# Patient Record
Sex: FEMALE | Race: WHITE | NOT HISPANIC OR LATINO | Employment: OTHER | URBAN - METROPOLITAN AREA
[De-identification: names, ages, dates, MRNs, and addresses within clinical notes are randomized per-mention and may not be internally consistent; named-entity substitution may affect disease eponyms.]

---

## 2017-03-17 ENCOUNTER — HISTORICAL (OUTPATIENT)
Dept: RADIOLOGY | Facility: HOSPITAL | Age: 77
End: 2017-03-17

## 2020-09-09 ENCOUNTER — HISTORICAL (OUTPATIENT)
Dept: ADMINISTRATIVE | Facility: HOSPITAL | Age: 80
End: 2020-09-09

## 2020-10-21 ENCOUNTER — HISTORICAL (OUTPATIENT)
Dept: ADMINISTRATIVE | Facility: HOSPITAL | Age: 80
End: 2020-10-21

## 2021-07-23 ENCOUNTER — HISTORICAL (OUTPATIENT)
Dept: URGENT CARE | Facility: CLINIC | Age: 81
End: 2021-07-23

## 2021-07-23 LAB
BILIRUB SERPL-MCNC: NEGATIVE MG/DL
BLOOD URINE, POC: NEGATIVE
CLARITY, POC UA: NORMAL
COLOR, POC UA: NORMAL
GLUCOSE UR QL STRIP: NEGATIVE
KETONES UR QL STRIP: NEGATIVE
LEUKOCYTE EST, POC UA: NORMAL
NITRITE, POC UA: POSITIVE
PH, POC UA: 6
PROTEIN, POC: NEGATIVE
SPECIFIC GRAVITY, POC UA: 1.01
UROBILINOGEN, POC UA: NORMAL

## 2021-08-02 LAB
BILIRUB SERPL-MCNC: NEGATIVE MG/DL
BLOOD URINE, POC: NEGATIVE
CLARITY, POC UA: CLEAR
COLOR, POC UA: NORMAL
GLUCOSE UR QL STRIP: NEGATIVE
KETONES UR QL STRIP: NEGATIVE
LEUKOCYTE EST, POC UA: NORMAL
NITRITE, POC UA: POSITIVE
PH, POC UA: 7
PROTEIN, POC: NEGATIVE
SPECIFIC GRAVITY, POC UA: 1.01
UROBILINOGEN, POC UA: NORMAL

## 2021-12-13 ENCOUNTER — HISTORICAL (OUTPATIENT)
Dept: ADMINISTRATIVE | Facility: HOSPITAL | Age: 81
End: 2021-12-13

## 2021-12-13 LAB — SARS-COV-2 RNA RESP QL NAA+PROBE: NEGATIVE

## 2022-01-06 ENCOUNTER — HISTORICAL (OUTPATIENT)
Dept: ADMINISTRATIVE | Facility: HOSPITAL | Age: 82
End: 2022-01-06

## 2022-01-06 LAB — SARS-COV-2 RNA RESP QL NAA+PROBE: POSITIVE

## 2022-04-10 ENCOUNTER — HISTORICAL (OUTPATIENT)
Dept: ADMINISTRATIVE | Facility: HOSPITAL | Age: 82
End: 2022-04-10
Payer: MEDICARE

## 2022-04-30 VITALS
WEIGHT: 189.63 LBS | BODY MASS INDEX: 37.23 KG/M2 | HEIGHT: 60 IN | DIASTOLIC BLOOD PRESSURE: 60 MMHG | OXYGEN SATURATION: 97 % | SYSTOLIC BLOOD PRESSURE: 123 MMHG

## 2022-04-30 NOTE — OP NOTE
Patient:   Pepper Bob            MRN: [id number]            FIN: [id number]               Age:   80 years     Sex:  Female     :  1940   Associated Diagnoses:   None   Author:   Kenrick Hernandez MD      Preoperative diagnosis: Cataract of the right  eye     Postoperative diagnosis: Same     Operative procedure: Cataract extraction with intraocular lens implant    Lens Style: [id number]    The patient was brought to the operating theater by wheelchair and under light sedation given topical anesthesia using 0.75% Marcaine.  After adequate anesthesia the patient was then prepped and draped in usual ophthalmological manner.   Karsten lid speculums were applied and under the surgical microscope a keratome incision was made temporally using a jessee keratome blade and a 15° jessee keratome stab incision was made in the superior nasal quadrant.  Viscoat was instilled into the anterior chamber and an anterior capsulorrhexis was performed using a bent 25-gauge needle and the anterior capsule flap withdrawn with Kelman forceps. Using an irrigating Kelman cystotome the nucleus was irrigated and rocked free from the cortical bed and the handpiece was withdrawn. The phacoemulsification handpiece was introduced into the eye and phacoemulsification carried out the posterior chamber and the iris plane while a nucleus manipulator was used to direct the nucleus fragments  towards the phacoemulsification tip and prevent corneal contact. After phacoemulsification of the nucleus was completed the handpiece was withdrawn and an irrigation-aspiration handpiece was introduced into the eye and all visible cortical fragments were aspirated from the eye without difficulty. The handpiece was withdrawn and Healon was introduced into the eye to inflate the anterior chamber and the capsular bag.  An intraocular lens implant was selected, inspected, folded and placed into the lens injector and then the lens carefully injected  into the capsular bag while the haptics were positioned using the nucleus manipulator. The Healon was then aspirated from the eye using an irrigation-aspiration handpiece and the handpiece withdrawn from the eye. The anterior chamber was inflated with balanced salt solution and the wound checked for water tightness and found to be intact.  The antibiotic drops used preoperatively were instilled into the eye and the patient sent to the outpatient recovery in good condition.

## 2022-04-30 NOTE — OP NOTE
Patient:   Pepper Bob            MRN: [id number]            FIN: [id number]               Age:   80 years     Sex:  Female     :  1940   Associated Diagnoses:   None   Author:   Kenrick Hernandez MD      Preoperative diagnosis: Cataract of the   left eye     Postoperative diagnosis: Same     Operative procedure: Cataract extraction with intraocular lens implant    Lens Style: ZCB00    The patient was brought to the operating theater by wheelchair and under light sedation given topical anesthesia using 0.75% Marcaine.  After adequate anesthesia the patient was then prepped and draped in usual ophthalmological manner.   Karsten lid speculums were applied and under the surgical microscope a keratome incision was made temporally using a jessee keratome blade and a 15° jessee keratome stab incision was made in the superior nasal quadrant.  Viscoat was instilled into the anterior chamber and an anterior capsulorrhexis was performed using a bent 25-gauge needle and the anterior capsule flap withdrawn with Kelman forceps. Using an irrigating Kelman cystotome the nucleus was irrigated and rocked free from the cortical bed and the handpiece was withdrawn. The phacoemulsification handpiece was introduced into the eye and phacoemulsification carried out the posterior chamber and the iris plane while a nucleus manipulator was used to direct the nucleus fragments  towards the phacoemulsification tip and prevent corneal contact. After phacoemulsification of the nucleus was completed the handpiece was withdrawn and an irrigation-aspiration handpiece was introduced into the eye and all visible cortical fragments were aspirated from the eye without difficulty. The handpiece was withdrawn and Healon was introduced into the eye to inflate the anterior chamber and the capsular bag.  An intraocular lens implant was selected, inspected, folded and placed into the lens injector and then the lens carefully injected  into the capsular bag while the haptics were positioned using the nucleus manipulator. The Healon was then aspirated from the eye using an irrigation-aspiration handpiece and the handpiece withdrawn from the eye. The anterior chamber was inflated with balanced salt solution and the wound checked for water tightness and found to be intact.  The antibiotic drops used preoperatively were instilled into the eye and the patient sent to the outpatient recovery in good condition.

## 2022-09-21 ENCOUNTER — HISTORICAL (OUTPATIENT)
Dept: ADMINISTRATIVE | Facility: HOSPITAL | Age: 82
End: 2022-09-21
Payer: MEDICARE

## 2023-03-29 ENCOUNTER — LAB VISIT (OUTPATIENT)
Dept: LAB | Facility: HOSPITAL | Age: 83
End: 2023-03-29
Attending: INTERNAL MEDICINE
Payer: MEDICARE

## 2023-03-29 DIAGNOSIS — L70.9 SAPHO SYNDROME: ICD-10-CM

## 2023-03-29 DIAGNOSIS — E78.5 HYPERLIPIDEMIA, UNSPECIFIED HYPERLIPIDEMIA TYPE: Primary | ICD-10-CM

## 2023-03-29 DIAGNOSIS — R53.83 FATIGUE, UNSPECIFIED TYPE: ICD-10-CM

## 2023-03-29 DIAGNOSIS — E55.9 VITAMIN D DEFICIENCY DISEASE: ICD-10-CM

## 2023-03-29 DIAGNOSIS — E66.9 OBESITY, UNSPECIFIED CLASSIFICATION, UNSPECIFIED OBESITY TYPE, UNSPECIFIED WHETHER SERIOUS COMORBIDITY PRESENT: ICD-10-CM

## 2023-03-29 DIAGNOSIS — I51.9 MYXEDEMA HEART DISEASE: ICD-10-CM

## 2023-03-29 DIAGNOSIS — E03.9 MYXEDEMA HEART DISEASE: ICD-10-CM

## 2023-03-29 DIAGNOSIS — M86.9 SAPHO SYNDROME: ICD-10-CM

## 2023-03-29 DIAGNOSIS — M65.9 SAPHO SYNDROME: ICD-10-CM

## 2023-03-29 DIAGNOSIS — M85.80 SAPHO SYNDROME: ICD-10-CM

## 2023-03-29 DIAGNOSIS — L40.3 SAPHO SYNDROME: ICD-10-CM

## 2023-03-29 DIAGNOSIS — I10 ESSENTIAL HYPERTENSION, MALIGNANT: ICD-10-CM

## 2023-03-29 LAB
ALBUMIN SERPL-MCNC: 4.1 G/DL (ref 3.4–4.8)
ALBUMIN/GLOB SERPL: 1.3 RATIO (ref 1.1–2)
ALP SERPL-CCNC: 46 UNIT/L (ref 40–150)
ALT SERPL-CCNC: 21 UNIT/L (ref 0–55)
AST SERPL-CCNC: 22 UNIT/L (ref 5–34)
BILIRUBIN DIRECT+TOT PNL SERPL-MCNC: 0.7 MG/DL
BUN SERPL-MCNC: 15.2 MG/DL (ref 9.8–20.1)
CALCIUM SERPL-MCNC: 9.9 MG/DL (ref 8.4–10.2)
CHLORIDE SERPL-SCNC: 102 MMOL/L (ref 98–107)
CHOLEST SERPL-MCNC: 137 MG/DL
CHOLEST/HDLC SERPL: 2 {RATIO} (ref 0–5)
CO2 SERPL-SCNC: 30 MMOL/L (ref 23–31)
CREAT SERPL-MCNC: 1 MG/DL (ref 0.55–1.02)
DEPRECATED CALCIDIOL+CALCIFEROL SERPL-MC: 26 NG/ML (ref 30–80)
ERYTHROCYTE [DISTWIDTH] IN BLOOD BY AUTOMATED COUNT: 12.9 % (ref 11.5–17)
GFR SERPLBLD CREATININE-BSD FMLA CKD-EPI: 56 MLS/MIN/1.73/M2
GLOBULIN SER-MCNC: 3.2 GM/DL (ref 2.4–3.5)
GLUCOSE SERPL-MCNC: 104 MG/DL (ref 82–115)
HCT VFR BLD AUTO: 41.5 % (ref 37–47)
HDLC SERPL-MCNC: 65 MG/DL (ref 35–60)
HGB BLD-MCNC: 13.6 G/DL (ref 12–16)
LDLC SERPL CALC-MCNC: 57 MG/DL (ref 50–140)
MCH RBC QN AUTO: 30.6 PG (ref 27–31)
MCHC RBC AUTO-ENTMCNC: 32.8 G/DL (ref 33–36)
MCV RBC AUTO: 93.5 FL (ref 80–94)
NRBC BLD AUTO-RTO: 0 %
PLATELET # BLD AUTO: 241 X10(3)/MCL (ref 130–400)
PMV BLD AUTO: 10.6 FL (ref 7.4–10.4)
POTASSIUM SERPL-SCNC: 4.8 MMOL/L (ref 3.5–5.1)
PROT SERPL-MCNC: 7.3 GM/DL (ref 5.8–7.6)
RBC # BLD AUTO: 4.44 X10(6)/MCL (ref 4.2–5.4)
SODIUM SERPL-SCNC: 135 MMOL/L (ref 136–145)
T3FREE SERPL-MCNC: 2.32 PG/ML (ref 1.57–3.91)
T4 FREE SERPL-MCNC: 1.23 NG/DL (ref 0.7–1.48)
TRIGL SERPL-MCNC: 75 MG/DL (ref 37–140)
TSH SERPL-ACNC: 0.67 UIU/ML (ref 0.35–4.94)
VLDLC SERPL CALC-MCNC: 15 MG/DL
WBC # SPEC AUTO: 6.2 X10(3)/MCL (ref 4.5–11.5)

## 2023-03-29 PROCEDURE — 82306 VITAMIN D 25 HYDROXY: CPT

## 2023-03-29 PROCEDURE — 80061 LIPID PANEL: CPT

## 2023-03-29 PROCEDURE — 84481 FREE ASSAY (FT-3): CPT

## 2023-03-29 PROCEDURE — 36415 COLL VENOUS BLD VENIPUNCTURE: CPT

## 2023-03-29 PROCEDURE — 84443 ASSAY THYROID STIM HORMONE: CPT

## 2023-03-29 PROCEDURE — 84439 ASSAY OF FREE THYROXINE: CPT

## 2023-03-29 PROCEDURE — 80053 COMPREHEN METABOLIC PANEL: CPT

## 2023-03-29 PROCEDURE — 85027 COMPLETE CBC AUTOMATED: CPT

## 2024-04-19 ENCOUNTER — OFFICE VISIT (OUTPATIENT)
Dept: URGENT CARE | Facility: CLINIC | Age: 84
End: 2024-04-19
Payer: MEDICARE

## 2024-04-19 VITALS
HEART RATE: 101 BPM | TEMPERATURE: 98 F | OXYGEN SATURATION: 97 % | BODY MASS INDEX: 38.1 KG/M2 | SYSTOLIC BLOOD PRESSURE: 100 MMHG | WEIGHT: 189 LBS | RESPIRATION RATE: 16 BRPM | DIASTOLIC BLOOD PRESSURE: 70 MMHG | HEIGHT: 59 IN

## 2024-04-19 DIAGNOSIS — J20.8 ACUTE BACTERIAL BRONCHITIS: ICD-10-CM

## 2024-04-19 DIAGNOSIS — B96.89 ACUTE BACTERIAL BRONCHITIS: ICD-10-CM

## 2024-04-19 DIAGNOSIS — R05.9 COUGH, UNSPECIFIED TYPE: Primary | ICD-10-CM

## 2024-04-19 LAB
CTP QC/QA: YES
MOLECULAR STREP A: NEGATIVE
POC MOLECULAR INFLUENZA A AGN: NEGATIVE
POC MOLECULAR INFLUENZA B AGN: NEGATIVE
SARS-COV-2 RDRP RESP QL NAA+PROBE: NEGATIVE

## 2024-04-19 PROCEDURE — 87502 INFLUENZA DNA AMP PROBE: CPT | Mod: QW,,, | Performed by: FAMILY MEDICINE

## 2024-04-19 PROCEDURE — 99214 OFFICE O/P EST MOD 30 MIN: CPT | Mod: ,,, | Performed by: FAMILY MEDICINE

## 2024-04-19 PROCEDURE — 87635 SARS-COV-2 COVID-19 AMP PRB: CPT | Mod: QW,,, | Performed by: FAMILY MEDICINE

## 2024-04-19 PROCEDURE — 87651 STREP A DNA AMP PROBE: CPT | Mod: QW,,, | Performed by: FAMILY MEDICINE

## 2024-04-19 RX ORDER — ALBUTEROL SULFATE 90 UG/1
AEROSOL, METERED RESPIRATORY (INHALATION)
COMMUNITY
Start: 2022-01-06

## 2024-04-19 RX ORDER — IPRATROPIUM BROMIDE 21 UG/1
SPRAY, METERED NASAL
COMMUNITY
Start: 2024-04-17

## 2024-04-19 RX ORDER — ALBUTEROL SULFATE 90 UG/1
1-2 AEROSOL, METERED RESPIRATORY (INHALATION) EVERY 4 HOURS PRN
Qty: 18 G | Refills: 0 | Status: SHIPPED | OUTPATIENT
Start: 2024-04-19 | End: 2024-05-19

## 2024-04-19 RX ORDER — VALSARTAN AND HYDROCHLOROTHIAZIDE 320; 12.5 MG/1; MG/1
1 TABLET, FILM COATED ORAL
COMMUNITY
Start: 2024-04-09

## 2024-04-19 RX ORDER — PROMETHAZINE HYDROCHLORIDE AND DEXTROMETHORPHAN HYDROBROMIDE 6.25; 15 MG/5ML; MG/5ML
5 SYRUP ORAL EVERY 6 HOURS PRN
Qty: 120 ML | Refills: 0 | Status: SHIPPED | OUTPATIENT
Start: 2024-04-19 | End: 2024-04-25

## 2024-04-19 RX ORDER — SELENIUM SULFIDE 23 MG/ML
SHAMPOO TOPICAL
COMMUNITY
Start: 2023-12-04

## 2024-04-19 RX ORDER — OXYBUTYNIN CHLORIDE 15 MG/1
15 TABLET, EXTENDED RELEASE ORAL
COMMUNITY
Start: 2024-04-09

## 2024-04-19 RX ORDER — LEVOTHYROXINE SODIUM 112 UG/1
112 TABLET ORAL
COMMUNITY
Start: 2024-03-31

## 2024-04-19 RX ORDER — PREDNISONE 20 MG/1
20 TABLET ORAL DAILY
Qty: 5 TABLET | Refills: 0 | Status: SHIPPED | OUTPATIENT
Start: 2024-04-19 | End: 2024-04-24

## 2024-04-19 RX ORDER — DOXYCYCLINE 100 MG/1
100 CAPSULE ORAL 2 TIMES DAILY
Qty: 14 CAPSULE | Refills: 0 | Status: SHIPPED | OUTPATIENT
Start: 2024-04-19 | End: 2024-04-26

## 2024-04-19 RX ORDER — PHENTERMINE HYDROCHLORIDE 37.5 MG/1
37.5 TABLET ORAL
COMMUNITY
Start: 2024-04-17

## 2024-04-19 RX ORDER — ROSUVASTATIN CALCIUM 20 MG/1
20 TABLET, COATED ORAL NIGHTLY
COMMUNITY
Start: 2024-04-09

## 2024-04-19 RX ORDER — SERTRALINE HYDROCHLORIDE 100 MG/1
100 TABLET, FILM COATED ORAL
COMMUNITY
Start: 2024-04-09

## 2024-04-19 NOTE — PROGRESS NOTES
"Subjective:      Patient ID: Pepper Bob is a 84 y.o. female.    Vitals:  height is 4' 11" (1.499 m) and weight is 85.7 kg (189 lb). Her temperature is 97.6 °F (36.4 °C). Her blood pressure is 100/70 and her pulse is 101. Her respiration is 16 and oxygen saturation is 97%.     Chief Complaint: Cough     Patient is a 84 y.o. female who presents to urgent care with complaints of cough (x 1 wk), "night time wheezing" (x 2 days), weakness ( x 1.5 days). Alleviating factors include rx nasal spray, xicam, cough drops x 4 days no relief.  Patient denies fever shortness of breath nausea vomiting or diarrhea. "Symptoms began as allergies, then ultimately worsened, becoming something else". Would like to test for covid/flu/strep.  Patient states she saw her PCP recently and he is changing her blood pressure medications but not sure why he is doing that.  Patient unaware that her blood pressure so low.  Denies any dizziness or lightheadedness upon standing.        Constitution: Negative.   HENT: Negative.     Cardiovascular: Negative.    Eyes: Negative.    Respiratory:  Positive for cough and wheezing.    Gastrointestinal: Negative.    Genitourinary: Negative.    Musculoskeletal: Negative.    Skin: Negative.    Allergic/Immunologic: Negative.    Neurological: Negative.    Hematologic/Lymphatic: Negative.       Objective:     Physical Exam   Constitutional: She is oriented to person, place, and time. She appears well-developed. She is cooperative.  Non-toxic appearance. She does not appear ill. No distress.   HENT:   Head: Normocephalic and atraumatic.   Ears:   Right Ear: Hearing and external ear normal.   Left Ear: Hearing and external ear normal.   Mouth/Throat: Oropharynx is clear and moist and mucous membranes are normal. No oropharyngeal exudate or posterior oropharyngeal erythema (postnasal drip).   Eyes: Conjunctivae and lids are normal.   Neck: Trachea normal and phonation normal. Neck supple. No edema present. " No erythema present. No neck rigidity present.   Cardiovascular: Normal rate.   Pulmonary/Chest: Effort normal. No stridor. No respiratory distress. She has no decreased breath sounds. She has wheezes. She has no rhonchi. She has no rales.   Abdominal: Normal appearance.   Neurological: She is alert and oriented to person, place, and time. She exhibits normal muscle tone. Coordination normal.   Skin: Skin is warm, dry, intact, not diaphoretic and no rash.   Psychiatric: Her speech is normal and behavior is normal. Mood, judgment and thought content normal.   Nursing note and vitals reviewed.         Previous History      Review of patient's allergies indicates:   Allergen Reactions    Penicillin g potassium Swelling    Tramadol Itching    Codeine Nausea And Vomiting       Past Medical History:   Diagnosis Date    Anxiety disorder, unspecified     HTN (hypertension)     Mild intermittent asthma, uncomplicated     Mixed hyperlipidemia      Current Outpatient Medications   Medication Instructions    albuterol (PROAIR HFA) 90 mcg/actuation inhaler Inhale into the lungs.    albuterol (VENTOLIN HFA) 90 mcg/actuation inhaler 1-2 puffs, Inhalation, Every 4 hours PRN, Rescue    doxycycline (MONODOX) 100 mg, Oral, 2 times daily    ipratropium (ATROVENT) 21 mcg (0.03 %) nasal spray SMARTSI Spray(s) Both Nares Every 6 Hours PRN    levothyroxine (SYNTHROID) 112 mcg, Oral    oxybutynin (DITROPAN XL) 15 mg, Oral    phentermine (ADIPEX-P) 37.5 mg, Oral    predniSONE (DELTASONE) 20 mg, Oral, Daily    promethazine-dextromethorphan (PROMETHAZINE-DM) 6.25-15 mg/5 mL Syrp 5 mLs, Oral, Every 6 hours PRN    rosuvastatin (CRESTOR) 20 mg, Oral, Nightly    selenium sulfide (SELRX) 2.3 % Sham Apply topically.    sertraline (ZOLOFT) 100 mg, Oral    valsartan-hydrochlorothiazide (DIOVAN-HCT) 320-12.5 mg per tablet 1 tablet, Oral     Past Surgical History:   Procedure Laterality Date    NO PAST SURGERIES       Family History   Problem  "Relation Name Age of Onset    No Known Problems Mother      No Known Problems Father      No Known Problems Sister         Social History     Tobacco Use    Smoking status: Never     Passive exposure: Never    Smokeless tobacco: Never   Substance Use Topics    Alcohol use: Not Currently    Drug use: Never        Physical Exam      Vital Signs Reviewed   /70 Comment: "feels weak"  Pulse 101   Temp 97.6 °F (36.4 °C)   Resp 16   Ht 4' 11" (1.499 m)   Wt 85.7 kg (189 lb)   SpO2 97%   BMI 38.17 kg/m²        Procedures    Procedures     Labs     Results for orders placed or performed in visit on 04/19/24   POCT COVID-19 Rapid Screening   Result Value Ref Range    POC Rapid COVID Negative Negative     Acceptable Yes    POCT Influenza A/B Molecular   Result Value Ref Range    POC Molecular Influenza A Ag Negative Negative    POC Molecular Influenza B Ag Negative Negative     Acceptable Yes    POCT Strep A, Molecular   Result Value Ref Range    Molecular Strep A, POC Negative Negative     Acceptable Yes        Assessment:     1. Cough, unspecified type    2. Acute bacterial bronchitis        Plan:   Negative COVID flu and strep  Medications sent to pharmacy  Recheck of your blood pressure was 100/70.  Call your PCP on Monday to let them know about your blood pressure.  Get plenty of hydration and rest  Using inhaler as needed for shortness of breath or wheezing  Cough syrup may cause drowsiness.  Do not drink alcohol or drive when taking it.  Best to take at bedtime.  Continue the prescribed nasal spray  If symptoms persist or worsen return to clinic or seek medical attention immediately    Cough, unspecified type  -     POCT COVID-19 Rapid Screening  -     POCT Influenza A/B Molecular  -     POCT Strep A, Molecular    Acute bacterial bronchitis    Other orders  -     doxycycline (MONODOX) 100 MG capsule; Take 1 capsule (100 mg total) by mouth 2 (two) times daily. for " 7 days  Dispense: 14 capsule; Refill: 0  -     promethazine-dextromethorphan (PROMETHAZINE-DM) 6.25-15 mg/5 mL Syrp; Take 5 mLs by mouth every 6 (six) hours as needed (cough).  Dispense: 120 mL; Refill: 0  -     albuterol (VENTOLIN HFA) 90 mcg/actuation inhaler; Inhale 1-2 puffs into the lungs every 4 (four) hours as needed for Wheezing or Shortness of Breath. Rescue  Dispense: 18 g; Refill: 0  -     predniSONE (DELTASONE) 20 MG tablet; Take 1 tablet (20 mg total) by mouth once daily. for 5 days  Dispense: 5 tablet; Refill: 0

## 2024-04-19 NOTE — PATIENT INSTRUCTIONS
Plan:   Negative COVID flu and strep  Medications sent to pharmacy  Recheck of your blood pressure was 100/70.  Call your PCP on Monday to let them know about your blood pressure.  Get plenty of hydration and rest  Using inhaler as needed for shortness of breath or wheezing  Cough syrup may cause drowsiness.  Do not drink alcohol or drive when taking it.  Best to take at bedtime.  Continue the prescribed nasal spray  If symptoms persist or worsen return to clinic or seek medical attention immediately

## 2024-05-30 ENCOUNTER — LAB VISIT (OUTPATIENT)
Dept: LAB | Facility: HOSPITAL | Age: 84
End: 2024-05-30
Attending: DERMATOLOGY
Payer: MEDICARE

## 2024-05-30 DIAGNOSIS — L29.9 PRURITUS: ICD-10-CM

## 2024-05-30 DIAGNOSIS — Z79.899 ENCOUNTER FOR LONG-TERM (CURRENT) USE OF OTHER MEDICATIONS: ICD-10-CM

## 2024-05-30 DIAGNOSIS — Z11.59 SCREENING FOR VIRAL DISEASE: Primary | ICD-10-CM

## 2024-05-30 DIAGNOSIS — L30.9 DERMATITIS: ICD-10-CM

## 2024-05-30 LAB
ALBUMIN SERPL-MCNC: 3.8 G/DL (ref 3.4–4.8)
ALBUMIN/GLOB SERPL: 1.2 RATIO (ref 1.1–2)
ALP SERPL-CCNC: 55 UNIT/L (ref 40–150)
ALT SERPL-CCNC: 15 UNIT/L (ref 0–55)
ANION GAP SERPL CALC-SCNC: 6 MEQ/L
AST SERPL-CCNC: 18 UNIT/L (ref 5–34)
BILIRUB SERPL-MCNC: 0.6 MG/DL
BUN SERPL-MCNC: 12.9 MG/DL (ref 9.8–20.1)
CALCIUM SERPL-MCNC: 9 MG/DL (ref 8.4–10.2)
CHLORIDE SERPL-SCNC: 103 MMOL/L (ref 98–107)
CO2 SERPL-SCNC: 27 MMOL/L (ref 23–31)
CREAT SERPL-MCNC: 0.91 MG/DL (ref 0.55–1.02)
CREAT/UREA NIT SERPL: 14
ERYTHROCYTE [DISTWIDTH] IN BLOOD BY AUTOMATED COUNT: 13.1 % (ref 11.5–17)
GFR SERPLBLD CREATININE-BSD FMLA CKD-EPI: >60 ML/MIN/1.73/M2
GLOBULIN SER-MCNC: 3.2 GM/DL (ref 2.4–3.5)
GLUCOSE SERPL-MCNC: 94 MG/DL (ref 82–115)
HCT VFR BLD AUTO: 39.5 % (ref 37–47)
HGB BLD-MCNC: 13.1 G/DL (ref 12–16)
IGA SERPL-MCNC: 211 MG/DL (ref 69–517)
IGG SERPL-MCNC: 1429 MG/DL (ref 522–1631)
IGM SERPL-MCNC: 162 MG/DL (ref 33–293)
MCH RBC QN AUTO: 31 PG (ref 27–31)
MCHC RBC AUTO-ENTMCNC: 33.2 G/DL (ref 33–36)
MCV RBC AUTO: 93.4 FL (ref 80–94)
NRBC BLD AUTO-RTO: 0 %
PLATELET # BLD AUTO: 229 X10(3)/MCL (ref 130–400)
PMV BLD AUTO: 11 FL (ref 7.4–10.4)
POTASSIUM SERPL-SCNC: 4.3 MMOL/L (ref 3.5–5.1)
PROT SERPL-MCNC: 7 GM/DL (ref 5.8–7.6)
RBC # BLD AUTO: 4.23 X10(6)/MCL (ref 4.2–5.4)
RHEUMATOID FACT SERPL-ACNC: <13 IU/ML
SODIUM SERPL-SCNC: 136 MMOL/L (ref 136–145)
TSH SERPL-ACNC: 0.2 UIU/ML (ref 0.35–4.94)
WBC # SPEC AUTO: 6.54 X10(3)/MCL (ref 4.5–11.5)

## 2024-05-30 PROCEDURE — 36415 COLL VENOUS BLD VENIPUNCTURE: CPT

## 2024-05-30 PROCEDURE — 82784 ASSAY IGA/IGD/IGG/IGM EACH: CPT

## 2024-05-30 PROCEDURE — 84443 ASSAY THYROID STIM HORMONE: CPT

## 2024-05-30 PROCEDURE — 86431 RHEUMATOID FACTOR QUANT: CPT

## 2024-05-30 PROCEDURE — 85027 COMPLETE CBC AUTOMATED: CPT

## 2024-05-30 PROCEDURE — 84166 PROTEIN E-PHORESIS/URINE/CSF: CPT

## 2024-05-30 PROCEDURE — 86431 RHEUMATOID FACTOR QUANT: CPT | Mod: 59

## 2024-05-30 PROCEDURE — 86039 ANTINUCLEAR ANTIBODIES (ANA): CPT

## 2024-05-30 PROCEDURE — 80053 COMPREHEN METABOLIC PANEL: CPT

## 2024-05-30 PROCEDURE — 83521 IG LIGHT CHAINS FREE EACH: CPT | Mod: 59

## 2024-05-30 PROCEDURE — 84165 PROTEIN E-PHORESIS SERUM: CPT

## 2024-05-31 LAB
ALBUMIN % SPEP (OHS): 49.83 (ref 48.1–59.5)
ALBUMIN SERPL-MCNC: 3.4 G/DL (ref 3.4–4.8)
ALBUMIN/GLOB SERPL: 1 RATIO (ref 1.1–2)
ALPHA 1 GLOB (OHS): 0.29 GM/DL (ref 0–0.4)
ALPHA 1 GLOB% (OHS): 4.14 (ref 2.3–4.9)
ALPHA 2 GLOB % (OHS): 10.48 (ref 6.9–13)
ALPHA 2 GLOB (OHS): 0.72 GM/DL (ref 0.4–1)
ANA SER QL HEP2 SUBST: NORMAL
BETA GLOB (OHS): 0.94 GM/DL (ref 0.7–1.3)
BETA GLOB% (OHS): 13.64 (ref 13.8–19.7)
GAMMA GLOBULIN % (OHS): 21.91 (ref 10.1–21.9)
GAMMA GLOBULIN (OHS): 1.51 GM/DL (ref 0.4–1.8)
GLOBULIN SER-MCNC: 3.5 GM/DL (ref 2.4–3.5)
KAPPA LC FREE SER-MCNC: 2.2 MG/DL (ref 0.33–1.94)
KAPPA LC FREE/LAMBDA FREE SER: 1.22 {RATIO} (ref 0.26–1.65)
LAMBDA LC FREE SERPL-MCNC: 1.81 MG/DL (ref 0.57–2.63)
M SPIKE % (OHS): ABNORMAL
M SPIKE (OHS): ABNORMAL
PATH REV: NORMAL
PROT SERPL-MCNC: 6.9 GM/DL (ref 5.8–7.6)

## 2024-06-03 LAB
RHEUMATOID FACTOR IGA QUANTITATIVE (OLG): 2.9 IU/ML
RHEUMATOID FACTOR IGM QUANTITATIVE (OLG): <0.6 IU/ML

## 2024-06-04 LAB
ALBUMIN UR ELPH-MCNC: NORMAL MG/DL
CREAT UR-MCNC: 36 MG/DL (ref 16–326)
PROT PATTERN UR ELPH-IMP: NORMAL
PROT UR-MCNC: <4 MG/DL
PROT/CREAT UR: <0.11 MG/MG

## 2024-10-21 ENCOUNTER — LAB VISIT (OUTPATIENT)
Dept: LAB | Facility: HOSPITAL | Age: 84
End: 2024-10-21
Attending: INTERNAL MEDICINE
Payer: MEDICARE

## 2024-10-21 DIAGNOSIS — Z79.899 NEED FOR PROPHYLACTIC CHEMOTHERAPY: ICD-10-CM

## 2024-10-21 DIAGNOSIS — E03.9 MYXEDEMA HEART DISEASE: ICD-10-CM

## 2024-10-21 DIAGNOSIS — E55.9 AVITAMINOSIS D: ICD-10-CM

## 2024-10-21 DIAGNOSIS — I10 ESSENTIAL HYPERTENSION, MALIGNANT: ICD-10-CM

## 2024-10-21 DIAGNOSIS — E78.5 HYPERLIPIDEMIA, UNSPECIFIED HYPERLIPIDEMIA TYPE: ICD-10-CM

## 2024-10-21 DIAGNOSIS — M10.9 GOUT, UNSPECIFIED CAUSE, UNSPECIFIED CHRONICITY, UNSPECIFIED SITE: ICD-10-CM

## 2024-10-21 DIAGNOSIS — Z51.81 ENCOUNTER FOR THERAPEUTIC DRUG MONITORING: Primary | ICD-10-CM

## 2024-10-21 DIAGNOSIS — Z13.228 SCREENING FOR PHENYLKETONURIA (PKU): ICD-10-CM

## 2024-10-21 DIAGNOSIS — I51.9 MYXEDEMA HEART DISEASE: ICD-10-CM

## 2024-10-21 DIAGNOSIS — R53.83 FATIGUE, UNSPECIFIED TYPE: ICD-10-CM

## 2024-10-21 LAB
25(OH)D3+25(OH)D2 SERPL-MCNC: 44 NG/ML (ref 30–80)
ALBUMIN SERPL-MCNC: 3.8 G/DL (ref 3.4–4.8)
ALBUMIN/GLOB SERPL: 1.3 RATIO (ref 1.1–2)
ALP SERPL-CCNC: 52 UNIT/L (ref 40–150)
ALT SERPL-CCNC: 17 UNIT/L (ref 0–55)
ANION GAP SERPL CALC-SCNC: 5 MEQ/L
AST SERPL-CCNC: 20 UNIT/L (ref 5–34)
BILIRUB SERPL-MCNC: 0.4 MG/DL
BUN SERPL-MCNC: 20.9 MG/DL (ref 9.8–20.1)
CALCIUM SERPL-MCNC: 9 MG/DL (ref 8.4–10.2)
CHLORIDE SERPL-SCNC: 104 MMOL/L (ref 98–107)
CO2 SERPL-SCNC: 29 MMOL/L (ref 23–31)
CREAT SERPL-MCNC: 1.16 MG/DL (ref 0.55–1.02)
CREAT/UREA NIT SERPL: 18
ERYTHROCYTE [DISTWIDTH] IN BLOOD BY AUTOMATED COUNT: 13.6 % (ref 11.5–17)
EST. AVERAGE GLUCOSE BLD GHB EST-MCNC: 111.2 MG/DL
GFR SERPLBLD CREATININE-BSD FMLA CKD-EPI: 46 ML/MIN/1.73/M2
GLOBULIN SER-MCNC: 3 GM/DL (ref 2.4–3.5)
GLUCOSE SERPL-MCNC: 116 MG/DL (ref 82–115)
HBA1C MFR BLD: 5.5 %
HCT VFR BLD AUTO: 39.9 % (ref 37–47)
HGB BLD-MCNC: 12.9 G/DL (ref 12–16)
MCH RBC QN AUTO: 30.4 PG (ref 27–31)
MCHC RBC AUTO-ENTMCNC: 32.3 G/DL (ref 33–36)
MCV RBC AUTO: 93.9 FL (ref 80–94)
NRBC BLD AUTO-RTO: 0 %
PLATELET # BLD AUTO: 203 X10(3)/MCL (ref 130–400)
PMV BLD AUTO: 10.3 FL (ref 7.4–10.4)
POTASSIUM SERPL-SCNC: 4.2 MMOL/L (ref 3.5–5.1)
PROT SERPL-MCNC: 6.8 GM/DL (ref 5.8–7.6)
RBC # BLD AUTO: 4.25 X10(6)/MCL (ref 4.2–5.4)
SODIUM SERPL-SCNC: 138 MMOL/L (ref 136–145)
T3FREE SERPL-MCNC: 2.33 PG/ML (ref 1.58–3.91)
T4 SERPL-MCNC: 9.56 UG/DL (ref 4.87–11.72)
TSH SERPL-ACNC: 1.72 UIU/ML (ref 0.35–4.94)
URATE SERPL-MCNC: 5.1 MG/DL (ref 2.6–6)
WBC # BLD AUTO: 8.18 X10(3)/MCL (ref 4.5–11.5)

## 2024-10-21 PROCEDURE — 84436 ASSAY OF TOTAL THYROXINE: CPT

## 2024-10-21 PROCEDURE — 82306 VITAMIN D 25 HYDROXY: CPT

## 2024-10-21 PROCEDURE — 84443 ASSAY THYROID STIM HORMONE: CPT

## 2024-10-21 PROCEDURE — 83036 HEMOGLOBIN GLYCOSYLATED A1C: CPT

## 2024-10-21 PROCEDURE — 85027 COMPLETE CBC AUTOMATED: CPT

## 2024-10-21 PROCEDURE — 84481 FREE ASSAY (FT-3): CPT

## 2024-10-21 PROCEDURE — 84550 ASSAY OF BLOOD/URIC ACID: CPT

## 2024-10-21 PROCEDURE — 36415 COLL VENOUS BLD VENIPUNCTURE: CPT

## 2024-10-21 PROCEDURE — 80053 COMPREHEN METABOLIC PANEL: CPT

## 2025-06-04 ENCOUNTER — HOSPITAL ENCOUNTER (EMERGENCY)
Facility: HOSPITAL | Age: 85
Discharge: HOME | End: 2025-06-04
Attending: EMERGENCY MEDICINE
Payer: MEDICARE

## 2025-06-04 VITALS
OXYGEN SATURATION: 98 % | BODY MASS INDEX: 36.29 KG/M2 | SYSTOLIC BLOOD PRESSURE: 125 MMHG | HEIGHT: 59 IN | TEMPERATURE: 98 F | WEIGHT: 180 LBS | DIASTOLIC BLOOD PRESSURE: 90 MMHG | HEART RATE: 90 BPM | RESPIRATION RATE: 20 BRPM

## 2025-06-04 DIAGNOSIS — M54.42 ACUTE BILATERAL LOW BACK PAIN WITH LEFT-SIDED SCIATICA: Primary | ICD-10-CM
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
## 2025-06-04 PROCEDURE — 99284 EMERGENCY DEPT VISIT MOD MDM: CPT | Mod: 25

## 2025-06-04 PROCEDURE — 25000003 PHARM REV CODE 250: Performed by: EMERGENCY MEDICINE

## 2025-06-04 RX ORDER — HYDROCODONE BITARTRATE AND ACETAMINOPHEN 10; 325 MG/1; MG/1
1 TABLET ORAL
Refills: 0 | Status: COMPLETED | OUTPATIENT
Start: 2025-06-04 | End: 2025-06-04

## 2025-06-04 RX ORDER — HYDROCODONE BITARTRATE AND ACETAMINOPHEN 7.5; 325 MG/1; MG/1
1 TABLET ORAL EVERY 6 HOURS PRN
Qty: 20 TABLET | Refills: 0 | Status: SHIPPED | OUTPATIENT
Start: 2025-06-04 | End: 2025-06-09

## 2025-06-04 RX ORDER — GABAPENTIN 300 MG/1
300 CAPSULE ORAL 3 TIMES DAILY
Qty: 42 CAPSULE | Refills: 0 | Status: SHIPPED | OUTPATIENT
Start: 2025-06-04 | End: 2025-06-18

## 2025-06-04 RX ADMIN — HYDROCODONE BITARTRATE AND ACETAMINOPHEN 1 TABLET: 10; 325 TABLET ORAL at 03:06

## 2025-06-04 NOTE — ED PROVIDER NOTES
Encounter Date: 6/4/2025       History     Chief Complaint   Patient presents with    Fall     Pt relates that she fell months ago, but has worsening pain to left leg radiating to buttock and across lower back. Pt concerned at difficulty walking over the past 3 weeks and can't lay down on it at night     The history is provided by the patient.   Fall  The accident occurred several weeks ago. The fall occurred while walking. She landed on A hard floor. The point of impact was the buttocks. The pain is present in the back and left hip. She was Ambulatory at the scene. There was No entrapment after the fall. There was No drug use involved in the accident. There was No alcohol use involved in the accident. Associated symptoms include back pain and paresthesias. Pertinent negatives include no neck pain, no paralysis, no fever, no bowel incontinence, no nausea and no loss of consciousness. The symptoms are aggravated by standing and pressure on the injury.   Notes history of multiple lumbar surgeries.    Review of patient's allergies indicates:   Allergen Reactions    Penicillin g potassium Swelling    Tramadol Itching    Codeine Nausea And Vomiting     Past Medical History:   Diagnosis Date    Anxiety disorder, unspecified     HTN (hypertension)     Mild intermittent asthma, uncomplicated     Mixed hyperlipidemia      Past Surgical History:   Procedure Laterality Date    NO PAST SURGERIES       Family History   Problem Relation Name Age of Onset    No Known Problems Mother      No Known Problems Father      No Known Problems Sister       Social History[1]  Review of Systems   Constitutional:  Negative for fever.   HENT:  Negative for sore throat.    Respiratory:  Negative for shortness of breath.    Cardiovascular:  Negative for chest pain.   Gastrointestinal:  Negative for bowel incontinence and nausea.   Genitourinary:  Negative for dysuria.   Musculoskeletal:  Positive for back pain. Negative for neck pain.   Skin:   Negative for rash.   Neurological:  Positive for paresthesias. Negative for loss of consciousness and weakness.   Hematological:  Does not bruise/bleed easily.       Physical Exam     Initial Vitals [06/04/25 1442]   BP Pulse Resp Temp SpO2   (!) 125/90 90 18 98.2 °F (36.8 °C) 98 %      MAP       --         Physical Exam    Nursing note and vitals reviewed.  Constitutional: She appears well-developed and well-nourished.   HENT:   Head: Normocephalic and atraumatic.   Right Ear: External ear normal.   Left Ear: External ear normal.   Eyes: Conjunctivae and EOM are normal. Pupils are equal, round, and reactive to light.   Neck: Neck supple.   Normal range of motion.  Cardiovascular:  Normal rate, regular rhythm, normal heart sounds and intact distal pulses.           Pulmonary/Chest: Breath sounds normal.   Abdominal: Abdomen is soft. Bowel sounds are normal.   obese   Musculoskeletal:         General: Normal range of motion.      Cervical back: Normal range of motion and neck supple.      Left hip: Tenderness present. No deformity or crepitus. Normal range of motion.        Legs:      Neurological: She is alert and oriented to person, place, and time. GCS score is 15. GCS eye subscore is 4. GCS verbal subscore is 5. GCS motor subscore is 6.   Skin: Skin is warm and dry. Capillary refill takes less than 2 seconds.   Psychiatric: She has a normal mood and affect. Her behavior is normal. Judgment and thought content normal.         ED Course   Procedures  Labs Reviewed - No data to display       Imaging Results              X-Ray Lumbar Spine Ap And Lateral (Final result)  Result time 06/04/25 15:29:50      Final result by Jacki Isaac MD (06/04/25 15:29:50)                   Impression:      Postsurgical change with progressed degenerative change compared to 2016 exam.  No appreciable acute osseous abnormality by plain radiographic evaluation.      Electronically signed by: Jacki  Poncho  Date:    06/04/2025  Time:    15:29               Narrative:    EXAMINATION:  XR LUMBAR SPINE AP AND LATERAL    CLINICAL HISTORY:  lumbar radiculopathy;   low back pain.  Radiating left leg pain.    TECHNIQUE:  3 views of the lumbar spine were performed.    COMPARISON:  07/25/2016    FINDINGS:  BONES: There are postsurgical changes of L2-L5 laminectomies and posterior spinal fusion.  Hardware appears engaged and intact.  Vertebral body heights are maintained without appreciable fracture.  Trace anterolisthesis L3 on L4 and L4 on L5 similar to prior.  Mild curvature lumbar spine convex to the left centered at L1-L2.    DISCS: Disc space narrowing above the level of fusion, progressed from previous.    There is endplate sclerosis and osseous ridging/disc osteophytes.    SOFT TISSUES: Aortic atherosclerosis.                                       X-Ray Hip 2 or 3 views Left with Pelvis when performed (Final result)  Result time 06/04/25 15:27:46      Final result by Jacki Isaac MD (06/04/25 15:27:46)                   Impression:      No acute osseous abnormality.      Electronically signed by: Jacki Isaac  Date:    06/04/2025  Time:    15:27               Narrative:    EXAMINATION:  XR HIP WITH PELVIS WHEN PERFORMED 2 OR 3 VIEWS LEFT    CLINICAL HISTORY:  Unspecified fall, initial encounter.  Worsening pain to left leg radiating to buttock and across low back.  Difficulty walking x3 weeks.    TECHNIQUE:  AP view of the pelvis and AP and frog leg lateral view of the left hip were performed.    COMPARISON:  None.    FINDINGS:  BONE: No fracture. No dislocation.    SOFT TISSUES: Unremarkable.                                       Medications   HYDROcodone-acetaminophen  mg per tablet 1 tablet (1 tablet Oral Given 6/4/25 1522)     Medical Decision Making  Amount and/or Complexity of Data Reviewed  Radiology: ordered and independent interpretation performed. Decision-making details documented in  ED Course.    Risk  Prescription drug management.    Differential includes:  strain, sciatica, fracture, bursitis, OA.  Will obtain x-rays of L-spine and left hip and give analgesic.                                  Clinical Impression:  Final diagnoses:  [W19.XXXA] Fall  [M54.42] Acute bilateral low back pain with left-sided sciatica (Primary)          ED Disposition Condition    Discharge Stable          ED Prescriptions       Medication Sig Dispense Start Date End Date Auth. Provider    gabapentin (NEURONTIN) 300 MG capsule Take 1 capsule (300 mg total) by mouth 3 (three) times daily. for 14 days 42 capsule 6/4/2025 6/18/2025 Faustino Hernandes MD    HYDROcodone-acetaminophen (NORCO) 7.5-325 mg per tablet Take 1 tablet by mouth every 6 (six) hours as needed for Pain. Final diagnoses: [W19.XXXA] Fall [M54.42] Acute bilateral low back pain with left-sided sciatica (Primary) 20 tablet 6/4/2025 6/9/2025 Faustino Hernandes MD          Follow-up Information       Follow up With Specialties Details Why Contact Info    Joesph Jackson MD Internal Medicine   [address] [facility]  [address]  [place] [address]  [phone]      Randy Askew MD Neurosurgery Schedule an appointment as soon as possible for a visit   [address] [facility] [facility]  [address]  [place] [address]  [phone]                     [1]   Social History  Tobacco Use    Smoking status: Never     Passive exposure: Never    Smokeless tobacco: Never   Substance Use Topics    Alcohol use: Not Currently    Drug use: Never        Faustino Hernandes MD  06/04/25 [id number]

## 2025-06-04 NOTE — ED TRIAGE NOTES
Pt relates that she fell months ago, but has worsening pain to left leg radiating to buttock and across lower back. Pt concerned at difficulty walking over the past 3 weeks and can't lay down on it at night.